# Patient Record
Sex: FEMALE | URBAN - METROPOLITAN AREA
[De-identification: names, ages, dates, MRNs, and addresses within clinical notes are randomized per-mention and may not be internally consistent; named-entity substitution may affect disease eponyms.]

---

## 2017-01-20 ENCOUNTER — APPOINTMENT (RX ONLY)
Dept: URBAN - METROPOLITAN AREA CLINIC 52 | Facility: CLINIC | Age: 22
Setting detail: DERMATOLOGY
End: 2017-01-20

## 2017-01-20 DIAGNOSIS — Z71.89 OTHER SPECIFIED COUNSELING: ICD-10-CM

## 2017-01-20 DIAGNOSIS — L70.8 OTHER ACNE: ICD-10-CM

## 2017-01-20 PROCEDURE — ? TREATMENT REGIMEN

## 2017-01-20 PROCEDURE — ? PRESCRIPTION

## 2017-01-20 PROCEDURE — 99213 OFFICE O/P EST LOW 20 MIN: CPT

## 2017-01-20 PROCEDURE — ? TCA CHEMICAL PEEL

## 2017-01-20 PROCEDURE — ? COUNSELING

## 2017-01-20 RX ORDER — SPIRONOLACTONE 25 MG/1
TABLET, FILM COATED ORAL QD
Qty: 30 | Refills: 0 | Status: ERX

## 2017-01-20 RX ORDER — SPIRONOLACTONE 25 MG/1
TABLET, FILM COATED ORAL BID
Qty: 60 | Refills: 0 | Status: ERX

## 2017-01-20 ASSESSMENT — LOCATION DETAILED DESCRIPTION DERM: LOCATION DETAILED: LEFT MEDIAL FOREHEAD

## 2017-01-20 ASSESSMENT — LOCATION SIMPLE DESCRIPTION DERM: LOCATION SIMPLE: LEFT FOREHEAD

## 2017-01-20 ASSESSMENT — LOCATION ZONE DERM: LOCATION ZONE: FACE

## 2017-01-20 NOTE — PROCEDURE: TCA CHEMICAL PEEL
Frost (0,1+,2+,3+,4+): 0
Detail Level: Zone
Chemical Peel: 10% TCA
Post-Care Instructions: I reviewed with the patient in detail post-care instructions. Patient should avoid sun exposure and wear sun protection.
Post Peel Care: After the procedure, the treatment area was washed with soap and water, and a post-peel cream was applied. Sun protection and post-care instructions were reviewed with the patient.
Number Of Coats: 4
Consent: Prior to the procedure, written consent was obtained and risks were reviewed, including but not limited to: redness, peeling, blistering, pigmentary change, scarring, infection, and pain.
Prep: The treated area was degreased with pre-peel cleanser, and vaseline was applied for protection of mucous membranes.

## 2017-01-20 NOTE — PROCEDURE: TREATMENT REGIMEN
Plan: Wash face with otc Cetaphil face wash twice a day \\nApply Clindamycin 1% topical solution on face every morning\\nTake 1 tablet Spironolactone 25mg once a day
Detail Level: Detailed

## 2017-07-18 ENCOUNTER — APPOINTMENT (RX ONLY)
Dept: URBAN - METROPOLITAN AREA CLINIC 52 | Facility: CLINIC | Age: 22
Setting detail: DERMATOLOGY
End: 2017-07-18

## 2017-07-18 DIAGNOSIS — B07.8 OTHER VIRAL WARTS: ICD-10-CM

## 2017-07-18 DIAGNOSIS — Z71.89 OTHER SPECIFIED COUNSELING: ICD-10-CM

## 2017-07-18 PROCEDURE — ? COUNSELING

## 2017-07-18 PROCEDURE — 17110 DESTRUCTION B9 LES UP TO 14: CPT

## 2017-07-18 PROCEDURE — 99213 OFFICE O/P EST LOW 20 MIN: CPT | Mod: 25

## 2017-07-18 PROCEDURE — ? BENIGN DESTRUCTION

## 2017-07-18 ASSESSMENT — LOCATION ZONE DERM
LOCATION ZONE: FACE
LOCATION ZONE: FINGER
LOCATION ZONE: TRUNK

## 2017-07-18 ASSESSMENT — LOCATION DETAILED DESCRIPTION DERM
LOCATION DETAILED: XIPHOID
LOCATION DETAILED: LEFT DISTAL PALMAR MIDDLE FINGER
LOCATION DETAILED: LEFT MID DORSAL INDEX FINGER
LOCATION DETAILED: INFERIOR MID FOREHEAD

## 2017-07-18 ASSESSMENT — LOCATION SIMPLE DESCRIPTION DERM
LOCATION SIMPLE: INFERIOR FOREHEAD
LOCATION SIMPLE: LEFT INDEX FINGER
LOCATION SIMPLE: LEFT MIDDLE FINGER
LOCATION SIMPLE: ABDOMEN

## 2017-07-18 ASSESSMENT — TOTAL NUMBER OF VERRUCA VULGARIS: # OF LESIONS?: 2

## 2017-07-18 NOTE — PROCEDURE: BENIGN DESTRUCTION
Treatment Number (Will Not Render If 0): 0
Post-Care Instructions: I reviewed with the patient in detail post-care instructions. Patient is to wear sunprotection, and avoid picking at any of the treated lesions. Pt may apply Vaseline to crusted or scabbing areas.
Detail Level: Detailed
Render Post-Care Instructions In Note?: yes
Add 52 Modifier (Optional): no
Consent: The patient's consent was obtained including but not limited to risks of crusting, scabbing, blistering, scarring, darker or lighter pigmentary change, recurrence, incomplete removal and infection.
Medical Necessity Clause: This procedure was medically necessary because the lesions that were treated were:
Medical Necessity Information: It is in your best interest to select a reason for this procedure from the list below. All of these items fulfill various CMS LCD requirements except the new and changing color options.
Anesthesia Volume In Cc: 0.5